# Patient Record
Sex: MALE | Race: WHITE | NOT HISPANIC OR LATINO | URBAN - METROPOLITAN AREA
[De-identification: names, ages, dates, MRNs, and addresses within clinical notes are randomized per-mention and may not be internally consistent; named-entity substitution may affect disease eponyms.]

---

## 2018-01-29 ENCOUNTER — INPATIENT (INPATIENT)
Facility: HOSPITAL | Age: 38
LOS: 1 days | Discharge: HOME | DRG: 100 | End: 2018-01-31
Attending: NEUROLOGICAL SURGERY | Admitting: NEUROLOGICAL SURGERY
Payer: COMMERCIAL

## 2018-01-29 VITALS
RESPIRATION RATE: 18 BRPM | TEMPERATURE: 98 F | OXYGEN SATURATION: 96 % | DIASTOLIC BLOOD PRESSURE: 85 MMHG | HEART RATE: 90 BPM | SYSTOLIC BLOOD PRESSURE: 147 MMHG

## 2018-01-29 DIAGNOSIS — Z98.890 OTHER SPECIFIED POSTPROCEDURAL STATES: Chronic | ICD-10-CM

## 2018-01-29 DIAGNOSIS — D49.6 NEOPLASM OF UNSPECIFIED BEHAVIOR OF BRAIN: ICD-10-CM

## 2018-01-29 DIAGNOSIS — R56.9 UNSPECIFIED CONVULSIONS: ICD-10-CM

## 2018-01-29 LAB
ANION GAP SERPL CALC-SCNC: 37 MMOL/L — HIGH (ref 5–17)
APTT BLD: 30.6 SEC — SIGNIFICANT CHANGE UP (ref 27.5–37.4)
BUN SERPL-MCNC: 21 MG/DL — SIGNIFICANT CHANGE UP (ref 7–23)
CALCIUM SERPL-MCNC: 10.4 MG/DL — SIGNIFICANT CHANGE UP (ref 8.4–10.5)
CHLORIDE SERPL-SCNC: 96 MMOL/L — SIGNIFICANT CHANGE UP (ref 96–108)
CO2 SERPL-SCNC: 13 MMOL/L — LOW (ref 22–31)
CREAT SERPL-MCNC: 1.29 MG/DL — SIGNIFICANT CHANGE UP (ref 0.5–1.3)
GLUCOSE SERPL-MCNC: 109 MG/DL — HIGH (ref 70–99)
HCT VFR BLD CALC: 51.3 % — HIGH (ref 39–50)
HGB BLD-MCNC: 16.3 G/DL — SIGNIFICANT CHANGE UP (ref 13–17)
INR BLD: 0.89 — SIGNIFICANT CHANGE UP (ref 0.88–1.16)
MCHC RBC-ENTMCNC: 28 PG — SIGNIFICANT CHANGE UP (ref 27–34)
MCHC RBC-ENTMCNC: 31.8 G/DL — LOW (ref 32–36)
MCV RBC AUTO: 88 FL — SIGNIFICANT CHANGE UP (ref 80–100)
PLATELET # BLD AUTO: 427 K/UL — HIGH (ref 150–400)
POTASSIUM SERPL-MCNC: 3.7 MMOL/L — SIGNIFICANT CHANGE UP (ref 3.5–5.3)
POTASSIUM SERPL-SCNC: 3.7 MMOL/L — SIGNIFICANT CHANGE UP (ref 3.5–5.3)
PROTHROM AB SERPL-ACNC: 9.9 SEC — SIGNIFICANT CHANGE UP (ref 9.8–12.7)
RBC # BLD: 5.83 M/UL — HIGH (ref 4.2–5.8)
RBC # FLD: 13.2 % — SIGNIFICANT CHANGE UP (ref 10.3–16.9)
SODIUM SERPL-SCNC: 146 MMOL/L — HIGH (ref 135–145)
WBC # BLD: 19.2 K/UL — HIGH (ref 3.8–10.5)
WBC # FLD AUTO: 19.2 K/UL — HIGH (ref 3.8–10.5)

## 2018-01-29 PROCEDURE — 70450 CT HEAD/BRAIN W/O DYE: CPT | Mod: 26

## 2018-01-29 PROCEDURE — 93010 ELECTROCARDIOGRAM REPORT: CPT | Mod: 59

## 2018-01-29 PROCEDURE — 99285 EMERGENCY DEPT VISIT HI MDM: CPT | Mod: 25

## 2018-01-29 RX ORDER — DOCUSATE SODIUM 100 MG
100 CAPSULE ORAL THREE TIMES A DAY
Qty: 0 | Refills: 0 | Status: DISCONTINUED | OUTPATIENT
Start: 2018-01-29 | End: 2018-01-31

## 2018-01-29 RX ORDER — ESCITALOPRAM OXALATE 10 MG/1
10 TABLET, FILM COATED ORAL DAILY
Qty: 0 | Refills: 0 | Status: DISCONTINUED | OUTPATIENT
Start: 2018-01-29 | End: 2018-01-31

## 2018-01-29 RX ORDER — ACETAMINOPHEN 500 MG
650 TABLET ORAL EVERY 6 HOURS
Qty: 0 | Refills: 0 | Status: DISCONTINUED | OUTPATIENT
Start: 2018-01-29 | End: 2018-01-31

## 2018-01-29 RX ORDER — ONDANSETRON 8 MG/1
4 TABLET, FILM COATED ORAL EVERY 6 HOURS
Qty: 0 | Refills: 0 | Status: DISCONTINUED | OUTPATIENT
Start: 2018-01-29 | End: 2018-01-31

## 2018-01-29 RX ORDER — LEVETIRACETAM 250 MG/1
500 TABLET, FILM COATED ORAL EVERY 12 HOURS
Qty: 0 | Refills: 0 | Status: DISCONTINUED | OUTPATIENT
Start: 2018-01-29 | End: 2018-01-29

## 2018-01-29 RX ORDER — TRAZODONE HCL 50 MG
50 TABLET ORAL AT BEDTIME
Qty: 0 | Refills: 0 | Status: DISCONTINUED | OUTPATIENT
Start: 2018-01-29 | End: 2018-01-31

## 2018-01-29 RX ORDER — LEVETIRACETAM 250 MG/1
1000 TABLET, FILM COATED ORAL
Qty: 0 | Refills: 0 | Status: DISCONTINUED | OUTPATIENT
Start: 2018-01-29 | End: 2018-01-31

## 2018-01-29 RX ORDER — METHYLPHENIDATE HCL 5 MG
10 TABLET ORAL
Qty: 0 | Refills: 0 | Status: DISCONTINUED | OUTPATIENT
Start: 2018-01-29 | End: 2018-01-30

## 2018-01-29 RX ORDER — ESCITALOPRAM OXALATE 10 MG/1
1 TABLET, FILM COATED ORAL
Qty: 0 | Refills: 0 | COMMUNITY

## 2018-01-29 RX ORDER — METHYLPHENIDATE HCL 5 MG
1 TABLET ORAL
Qty: 0 | Refills: 0 | COMMUNITY

## 2018-01-29 RX ORDER — DEXAMETHASONE 0.5 MG/5ML
10 ELIXIR ORAL ONCE
Qty: 0 | Refills: 0 | Status: COMPLETED | OUTPATIENT
Start: 2018-01-29 | End: 2018-01-29

## 2018-01-29 RX ORDER — TRAZODONE HCL 50 MG
1 TABLET ORAL
Qty: 0 | Refills: 0 | COMMUNITY

## 2018-01-29 RX ORDER — SODIUM CHLORIDE 9 MG/ML
1000 INJECTION INTRAMUSCULAR; INTRAVENOUS; SUBCUTANEOUS ONCE
Qty: 0 | Refills: 0 | Status: COMPLETED | OUTPATIENT
Start: 2018-01-29 | End: 2018-01-29

## 2018-01-29 RX ORDER — LEVETIRACETAM 250 MG/1
1000 TABLET, FILM COATED ORAL ONCE
Qty: 0 | Refills: 0 | Status: COMPLETED | OUTPATIENT
Start: 2018-01-29 | End: 2018-01-29

## 2018-01-29 RX ADMIN — SODIUM CHLORIDE 1000 MILLILITER(S): 9 INJECTION INTRAMUSCULAR; INTRAVENOUS; SUBCUTANEOUS at 20:40

## 2018-01-29 RX ADMIN — LEVETIRACETAM 400 MILLIGRAM(S): 250 TABLET, FILM COATED ORAL at 21:58

## 2018-01-29 RX ADMIN — Medication 10 MILLIGRAM(S): at 21:59

## 2018-01-29 RX ADMIN — Medication 2 MILLIGRAM(S): at 20:45

## 2018-01-29 NOTE — H&P ADULT - NSHPLABSRESULTS_GEN_ALL_CORE
< from: CT Head No Cont (01.29.18 @ 21:07) >    Status post right craniotomy/cranioplasty with a large region of   encephalomalacia/gliosis the right temporooccipital region. No midline   shift or herniation. Comparison with prior outside imaging is recommended   to assess for changes.    < end of copied text >

## 2018-01-29 NOTE — ED ADULT NURSE NOTE - OBJECTIVE STATEMENT
Pt BIBA from MD office where he reportedly had SEIZURE. Pt parents, pt has hx of BRAIN TUMOR recently dx'd. Had first known seizure a few weeks ago after having KEPPRA withdrawn. Pt then restarted on KEPPRA and was sx free until today. While in ED, pt had another SEIZURE witnessed by MD FOX. Pt given ATIVAN 2mg IV stat; followed by CT study with reported cerebral swelling. Pt then given KEPPRA 1 gm, with DECADRON 10mg IV. Pt now alert, oriented, mentating well. Awaiting Neuro Surg consult.

## 2018-01-29 NOTE — H&P ADULT - HISTORY OF PRESENT ILLNESS
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 Pt had another seizure in ER, given 2mg Ativan. Pt now awake, but slightly lethargic/Post ictal .No HA, n/v, no reported drugs/alcohol, fever, chills, pt has residual L visual cut since surgery.  	According to  pt had stable MRI Brain 2 weeks ago. Pt takes keppra 500 BID and took it this morning. PT had a seizure 6 weeks ago when they tried to wean him off keppra. Pt was loaded with 1G keppra in ER.

## 2018-01-29 NOTE — ED PROVIDER NOTE - OBJECTIVE STATEMENT
37 m w seizure- px w hx of Astrocytoma resected in April last year- no chemo/xrt s/p removal  presents s/p gen seizure in therapists office  no n/v, no reported drugs/alcohol  had neg MRI 2 weeks ago  no exac/allev factors  takes Keppra for seizures

## 2018-01-29 NOTE — H&P ADULT - ATTENDING COMMENTS
Patient seen and examined by me. Agree with above. He presented with a generalized tonic-clonic seizure in the setting of lowering his keppra dosing. Patient's neurosurgeon is Yury Mckeon and his neurooncologist is Sukhwinder Min. He plans to followup with these physicians upon discharge. Plan at this time is for increasing keppra dosing to 1 g bid, EEG monitoring with epilepsy consultation, and physical therapy for disposition planning.    Quentin Orantes M.D.  Neurosurgery

## 2018-01-29 NOTE — ED PROVIDER NOTE - MEDICAL DECISION MAKING DETAILS
seizure- on Keppra- increased cerebral edema- steroids given- d/w Epilepsy- rec IV Keppra 1 GM  d/w Dr Min at [facility]- agree with mgmt- do not recommend transfer tonight  await Nsurgical Eval seizure- on Keppra- increased cerebral edema- steroids given- d/w Epilepsy- rec IV Keppra 1 GM  d/w Dr Min at [facility]- agree with mgmt- do not recommend transfer tonight  await Nsurgical Eval  Patient was more responsive upon meeting. Awaiting neurosurgery disposition

## 2018-01-29 NOTE — H&P ADULT - NSHPPHYSICALEXAM_GEN_ALL_CORE
Lethargic, easily arousable  AxOX3  speech coherent  face symmetric  L vision cut  LUE pronator drift  LUE 3+/5  otherwise 5/5 motor  sensory intact  gait not assessed

## 2018-01-29 NOTE — ED PROVIDER NOTE - PROGRESS NOTE DETAILS
took sign out from dr. adams, pt to be admitted for nsg. pt has hx of epilepsy, cerebral edema, hx of astrocytoma. s/p removal; nsg evaluated. plan for admit to nsg icu

## 2018-01-30 LAB
ANION GAP SERPL CALC-SCNC: 16 MMOL/L — SIGNIFICANT CHANGE UP (ref 5–17)
APPEARANCE UR: CLEAR — SIGNIFICANT CHANGE UP
BILIRUB UR-MCNC: NEGATIVE — SIGNIFICANT CHANGE UP
BUN SERPL-MCNC: 15 MG/DL — SIGNIFICANT CHANGE UP (ref 7–23)
CALCIUM SERPL-MCNC: 9.5 MG/DL — SIGNIFICANT CHANGE UP (ref 8.4–10.5)
CHLORIDE SERPL-SCNC: 97 MMOL/L — SIGNIFICANT CHANGE UP (ref 96–108)
CO2 SERPL-SCNC: 21 MMOL/L — LOW (ref 22–31)
COLOR SPEC: YELLOW — SIGNIFICANT CHANGE UP
CREAT SERPL-MCNC: 0.92 MG/DL — SIGNIFICANT CHANGE UP (ref 0.5–1.3)
DIFF PNL FLD: (no result)
GLUCOSE SERPL-MCNC: 152 MG/DL — HIGH (ref 70–99)
GLUCOSE UR QL: NEGATIVE — SIGNIFICANT CHANGE UP
HCT VFR BLD CALC: 44.3 % — SIGNIFICANT CHANGE UP (ref 39–50)
HGB BLD-MCNC: 15.8 G/DL — SIGNIFICANT CHANGE UP (ref 13–17)
KETONES UR-MCNC: NEGATIVE — SIGNIFICANT CHANGE UP
LEUKOCYTE ESTERASE UR-ACNC: NEGATIVE — SIGNIFICANT CHANGE UP
MCHC RBC-ENTMCNC: 29.7 PG — SIGNIFICANT CHANGE UP (ref 27–34)
MCHC RBC-ENTMCNC: 35.7 G/DL — SIGNIFICANT CHANGE UP (ref 32–36)
MCV RBC AUTO: 83.3 FL — SIGNIFICANT CHANGE UP (ref 80–100)
NITRITE UR-MCNC: NEGATIVE — SIGNIFICANT CHANGE UP
PH UR: 5.5 — SIGNIFICANT CHANGE UP (ref 5–8)
PLATELET # BLD AUTO: 313 K/UL — SIGNIFICANT CHANGE UP (ref 150–400)
POTASSIUM SERPL-MCNC: 3.9 MMOL/L — SIGNIFICANT CHANGE UP (ref 3.5–5.3)
POTASSIUM SERPL-SCNC: 3.9 MMOL/L — SIGNIFICANT CHANGE UP (ref 3.5–5.3)
PROT UR-MCNC: NEGATIVE MG/DL — SIGNIFICANT CHANGE UP
RBC # BLD: 5.32 M/UL — SIGNIFICANT CHANGE UP (ref 4.2–5.8)
RBC # FLD: 13.1 % — SIGNIFICANT CHANGE UP (ref 10.3–16.9)
SODIUM SERPL-SCNC: 134 MMOL/L — LOW (ref 135–145)
SP GR SPEC: 1.02 — SIGNIFICANT CHANGE UP (ref 1–1.03)
UROBILINOGEN FLD QL: 0.2 E.U./DL — SIGNIFICANT CHANGE UP
WBC # BLD: 23.3 K/UL — HIGH (ref 3.8–10.5)
WBC # FLD AUTO: 23.3 K/UL — HIGH (ref 3.8–10.5)

## 2018-01-30 PROCEDURE — 95951: CPT | Mod: 26

## 2018-01-30 PROCEDURE — 99222 1ST HOSP IP/OBS MODERATE 55: CPT

## 2018-01-30 RX ORDER — METHYLPHENIDATE HCL 5 MG
10 TABLET ORAL
Qty: 0 | Refills: 0 | Status: DISCONTINUED | OUTPATIENT
Start: 2018-01-30 | End: 2018-01-31

## 2018-01-30 RX ORDER — TRAZODONE HCL 50 MG
50 TABLET ORAL ONCE
Qty: 0 | Refills: 0 | Status: COMPLETED | OUTPATIENT
Start: 2018-01-30 | End: 2018-01-31

## 2018-01-30 RX ADMIN — Medication 50 MILLIGRAM(S): at 01:40

## 2018-01-30 RX ADMIN — Medication 100 MILLIGRAM(S): at 06:02

## 2018-01-30 RX ADMIN — Medication 650 MILLIGRAM(S): at 01:28

## 2018-01-30 RX ADMIN — Medication 100 MILLIGRAM(S): at 15:22

## 2018-01-30 RX ADMIN — Medication 650 MILLIGRAM(S): at 01:03

## 2018-01-30 RX ADMIN — Medication 650 MILLIGRAM(S): at 07:59

## 2018-01-30 RX ADMIN — ESCITALOPRAM OXALATE 10 MILLIGRAM(S): 10 TABLET, FILM COATED ORAL at 12:31

## 2018-01-30 RX ADMIN — LEVETIRACETAM 1000 MILLIGRAM(S): 250 TABLET, FILM COATED ORAL at 18:27

## 2018-01-30 RX ADMIN — LEVETIRACETAM 1000 MILLIGRAM(S): 250 TABLET, FILM COATED ORAL at 06:02

## 2018-01-30 RX ADMIN — Medication 10 MILLIGRAM(S): at 06:02

## 2018-01-30 NOTE — PROGRESS NOTE ADULT - SUBJECTIVE AND OBJECTIVE BOX
HPI:  38 yo male w/px w hx of Astrocytoma IDH 1 mutation resected 80% in 2017 in [facility]  - no chemo/xrt s/p removal  	presents s/p gen seizure in therapists office. Pt had another seizure in ER, given 2mg Ativan. Pt now awake, but slightly lethargic/Post ictal .No HA, n/v, no reported drugs/alcohol, fever, chills, pt has residual L visual cut since surgery.  	According to  pt had stable MRI Brain 2 weeks ago. Pt takes keppra 500 BID and took it this morning. PT had a seizure 6 weeks ago when they tried to wean him off keppra. Pt was loaded with 1G keppra in ER. (2018 23:29)    OVERNIGHT EVENTS: No major  events overnight. Patient is on VEEG without complaint. Denies HA. Stable Left  side weakness  Vital Signs Last 24 Hrs  T(C): 36.9 (2018 09:33), Max: 37.2 (2018 02:00)  T(F): 98.4 (2018 09:33), Max: 98.9 (2018 02:00)  HR: 80 (2018 08:33) (68 - 90)  BP: 167/91 (2018 08:33) (119/83 - 167/91)  BP(mean): 116 (2018 08:33) (98 - 116)  RR: 16 (2018 04:36) (16 - 18)  SpO2: 96% (2018 08:33) (94% - 96%)    I&O's Summary  Physical Exam: Lethargic, easily arousable  	AxOX3  	speech coherent  	face symmetric  	L vision cut  	LUE pronator drift  	LUE 3+/5  	otherwise 5/5 motor  	sensory intact  TUBES/LINES:  [] Garcia  [] Lumbar Drain  [] Wound Drains  [] Others      DIET: regular  [] NPO  [] Mechanical  [] Tube feeds    LABS:                        15.8   23.3  )-----------( 313      ( 2018 09:04 )             44.3     01-30    134<L>  |  97  |  15  ----------------------------<  152<H>  3.9   |  21<L>  |  0.92    Ca    9.5      2018 09:04      PT/INR - ( 2018 21:04 )   PT: 9.9 sec;   INR: 0.89          PTT - ( 2018 21:04 )  PTT:30.6 sec  Urinalysis Basic - ( 2018 00:34 )    Color: Yellow / Appearance: Clear / S.025 / pH: x  Gluc: x / Ketone: NEGATIVE  / Bili: Negative / Urobili: 0.2 E.U./dL   Blood: x / Protein: NEGATIVE mg/dL / Nitrite: NEGATIVE   Leuk Esterase: NEGATIVE / RBC: < 5 /HPF / WBC < 5 /HPF   Sq Epi: x / Non Sq Epi: 0-5 /HPF / Bacteria: Present /HPF          CAPILLARY BLOOD GLUCOSE          Drug Levels: [] N/A    CSF Analysis: [] N/A      Allergies    penicillin (Rash; Urticaria; Hives)    Intolerances      MEDICATIONS:  Antibiotics:    Neuro:  acetaminophen   Tablet. 650 milliGRAM(s) Oral every 6 hours PRN  escitalopram 10 milliGRAM(s) Oral daily  levETIRAcetam 1000 milliGRAM(s) Oral two times a day  methylphenidate 10 milliGRAM(s) Oral two times a day  ondansetron Injectable 4 milliGRAM(s) IV Push every 6 hours PRN  traZODone 50 milliGRAM(s) Oral at bedtime    Anticoagulation:    OTHER:  docusate sodium 100 milliGRAM(s) Oral three times a day    IVF:    CULTURES:    RADIOLOGY & ADDITIONAL TESTS:      ASSESSMENT:  37y Male s/p seizure at home. Now on VEEG, Doing well    PLAN: complete video EEG. If no activity on Keppra 1G bid, will discharge home to follow with his neurosurgeon of [facility].  PT/OT  DW Dr. Orantes    DVT PROPHYLAXIS:  [x] Venodynes                                [x] Heparin/Lovenox    DISPOSITION: pending PT.

## 2018-01-31 VITALS — TEMPERATURE: 98 F

## 2018-01-31 PROCEDURE — 80177 DRUG SCRN QUAN LEVETIRACETAM: CPT

## 2018-01-31 PROCEDURE — 93005 ELECTROCARDIOGRAM TRACING: CPT

## 2018-01-31 PROCEDURE — 85027 COMPLETE CBC AUTOMATED: CPT

## 2018-01-31 PROCEDURE — 85610 PROTHROMBIN TIME: CPT

## 2018-01-31 PROCEDURE — 70450 CT HEAD/BRAIN W/O DYE: CPT

## 2018-01-31 PROCEDURE — 36415 COLL VENOUS BLD VENIPUNCTURE: CPT

## 2018-01-31 PROCEDURE — 85730 THROMBOPLASTIN TIME PARTIAL: CPT

## 2018-01-31 PROCEDURE — 80048 BASIC METABOLIC PNL TOTAL CA: CPT

## 2018-01-31 PROCEDURE — 81001 URINALYSIS AUTO W/SCOPE: CPT

## 2018-01-31 PROCEDURE — 95813 EEG EXTND MNTR 61-119 MIN: CPT | Mod: 26

## 2018-01-31 PROCEDURE — 96374 THER/PROPH/DIAG INJ IV PUSH: CPT

## 2018-01-31 PROCEDURE — 99285 EMERGENCY DEPT VISIT HI MDM: CPT | Mod: 25

## 2018-01-31 PROCEDURE — 95951: CPT

## 2018-01-31 PROCEDURE — 96375 TX/PRO/DX INJ NEW DRUG ADDON: CPT

## 2018-01-31 RX ORDER — LEVETIRACETAM 250 MG/1
1 TABLET, FILM COATED ORAL
Qty: 60 | Refills: 0 | OUTPATIENT
Start: 2018-01-31 | End: 2018-03-01

## 2018-01-31 RX ORDER — ACETAMINOPHEN 500 MG
2 TABLET ORAL
Qty: 0 | Refills: 0 | COMMUNITY
Start: 2018-01-31

## 2018-01-31 RX ORDER — DOCUSATE SODIUM 100 MG
1 CAPSULE ORAL
Qty: 0 | Refills: 0 | COMMUNITY
Start: 2018-01-31

## 2018-01-31 RX ORDER — LEVETIRACETAM 250 MG/1
1 TABLET, FILM COATED ORAL
Qty: 0 | Refills: 0 | COMMUNITY

## 2018-01-31 RX ADMIN — ESCITALOPRAM OXALATE 10 MILLIGRAM(S): 10 TABLET, FILM COATED ORAL at 11:21

## 2018-01-31 RX ADMIN — LEVETIRACETAM 1000 MILLIGRAM(S): 250 TABLET, FILM COATED ORAL at 07:09

## 2018-01-31 RX ADMIN — Medication 100 MILLIGRAM(S): at 07:09

## 2018-01-31 RX ADMIN — Medication 10 MILLIGRAM(S): at 07:09

## 2018-01-31 RX ADMIN — Medication 100 MILLIGRAM(S): at 00:09

## 2018-01-31 RX ADMIN — Medication 50 MILLIGRAM(S): at 00:09

## 2018-01-31 NOTE — DISCHARGE NOTE ADULT - PATIENT PORTAL LINK FT
“You can access the [employer] Patient Portal, offered by [facility], by registering with the following website: [email or web address]”

## 2018-01-31 NOTE — DISCHARGE NOTE ADULT - MEDICATION SUMMARY - MEDICATIONS TO STOP TAKING
I will STOP taking the medications listed below when I get home from the hospital:    Keppra 500 mg oral tablet  -- 1 tab(s) by mouth 2 times a day

## 2018-01-31 NOTE — DISCHARGE NOTE ADULT - HOSPITAL COURSE
Admitted for evaluation of Seizure . IT resolved with  keppra IV. Patient was placed on keppra 1000mg bid. Video EEG did not show any new seizure activity.

## 2018-01-31 NOTE — PROGRESS NOTE ADULT - SUBJECTIVE AND OBJECTIVE BOX
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 Pt had another seizure in ER, given 2mg Ativan. Pt now awake, but slightly lethargic/Post ictal .No HA, n/v, no reported drugs/alcohol, fever, chills, pt has residual L visual cut since surgery.  	According to  pt had stable MRI Brain 2 weeks ago. Pt takes keppra 500 BID and took it this morning. PT had a seizure 6 weeks ago when they tried to wean him off keppra. Pt was loaded with 1G keppra in ER. (2018 23:29)    OVERNIGHT EVENTS: No major events overnight. Some EEG cables got misplaced. It was readjusted this morning.  Patient has no complaint. He is expecting to go home today. He is on keppra 1 grm BID, denies clinical seizure.  Vital Signs Last 24 Hrs  T(C): 37 (2018 08:51), Max: 37.1 (2018 14:03)  T(F): 98.6 (2018 08:51), Max: 98.7 (2018 14:03)  HR: 78 (2018 08:51) (54 - 78)  BP: 120/67 (2018 08:51) (103/62 - 134/84)  BP(mean): 98 (2018 04:43) (75 - 103)  RR: 12 (2018 08:51) (12 - 22)  SpO2: 97% (2018 08:51) (95% - 97%)    I&O's Summary      PHYSICAL EXAM:  Neurological:  A& x 3 NAD, PERRL, EOMI    Motor exam: improved left side strenght. 5/5 x 4. No sensory deficit          TUBES/LINES:  [] Garcia  [] Lumbar Drain  [] Wound Drains  [] Others      DIET:  [] NPO  [] Mechanical  [] Tube feeds    LABS:                        15.8   23.3  )-----------( 313      ( 2018 09:04 )             44.3     01-30    134<L>  |  97  |  15  ----------------------------<  152<H>  3.9   |  21<L>  |  0.92    Ca    9.5      2018 09:04      PT/INR - ( 2018 21:04 )   PT: 9.9 sec;   INR: 0.89          PTT - ( 2018 21:04 )  PTT:30.6 sec  Urinalysis Basic - ( 2018 00:34 )    Color: Yellow / Appearance: Clear / S.025 / pH: x  Gluc: x / Ketone: NEGATIVE  / Bili: Negative / Urobili: 0.2 E.U./dL   Blood: x / Protein: NEGATIVE mg/dL / Nitrite: NEGATIVE   Leuk Esterase: NEGATIVE / RBC: < 5 /HPF / WBC < 5 /HPF   Sq Epi: x / Non Sq Epi: 0-5 /HPF / Bacteria: Present /HPF          CAPILLARY BLOOD GLUCOSE          Drug Levels: [] N/A    CSF Analysis: [] N/A      Allergies    penicillin (Rash; Urticaria; Hives)    Intolerances      MEDICATIONS:  Antibiotics:    Neuro:  acetaminophen   Tablet. 650 milliGRAM(s) Oral every 6 hours PRN  escitalopram 10 milliGRAM(s) Oral daily  levETIRAcetam 1000 milliGRAM(s) Oral two times a day  methylphenidate 10 milliGRAM(s) Oral <User Schedule>  ondansetron Injectable 4 milliGRAM(s) IV Push every 6 hours PRN  traZODone 50 milliGRAM(s) Oral at bedtime    Anticoagulation:    OTHER:  docusate sodium 100 milliGRAM(s) Oral three times a day    IVF:    CULTURES:    RADIOLOGY & ADDITIONAL TESTS:      ASSESSMENT:37y Male s/p seizure at home. Now on VEEG, Doing well. Awaiting discharge today    PLAN:  neurology to read VEEG, if no seizure, will discharge him home to follow with his [facility] neirologist and neurosurgeon.  ERNIE Orantes  DVT PROPHYLAXIS:  [x] Venodynes                                [x] Heparin/Lovenox    DISPOSITION:

## 2018-01-31 NOTE — DISCHARGE NOTE ADULT - MEDICATION SUMMARY - MEDICATIONS TO TAKE
I will START or STAY ON the medications listed below when I get home from the hospital:    acetaminophen 325 mg oral tablet  -- 2 tab(s) by mouth every 6 hours, As needed, Mild Pain (1 - 3)  -- Indication: For pain or fever    Keppra 1000 mg oral tablet  -- 1 tab(s) by mouth 2 times a day MDD:2  -- Indication: For Seizure    traZODone 50 mg oral tablet  -- 1 tab(s) by mouth once a day (at bedtime)  -- Indication: For anxiety    Lexapro 10 mg oral tablet  -- 1 tab(s) by mouth once a day  -- Indication: For depresion    Ritalin 10 mg oral tablet  -- 1 tab(s) by mouth 2 times a day  -- Indication: For Brain tumor    docusate sodium 100 mg oral capsule  -- 1 cap(s) by mouth 3 times a day  -- Indication: For Bowel regimen

## 2018-01-31 NOTE — DISCHARGE NOTE ADULT - ADDITIONAL INSTRUCTIONS
NO seizure activity noted in VEEG. Please, follow with  your [facility] doctors: Neurosurgeon - Yury Mckeon and Oncologist: Sukhwinder Min.  Keppra dose is increased to 1000mg 2 times per day.

## 2018-01-31 NOTE — DISCHARGE NOTE ADULT - CARE PROVIDER_API CALL
Quentin Orantes), Neurosurgery  [address], [place] [facility]  Phone: [phone]  Fax: [phone]

## 2018-01-31 NOTE — DISCHARGE NOTE ADULT - CARE PLAN
Principal Discharge DX:	Seizure  Goal:	stop seizure activity  Assessment and plan of treatment:	keppra increased to 1000mg bid. No seizure activity on video EEG.

## 2018-02-01 LAB — LEVETIRACETAM SERPL-MCNC: 5.1 MCG/ML — LOW (ref 12–46)

## 2018-02-05 DIAGNOSIS — Z86.011 PERSONAL HISTORY OF BENIGN NEOPLASM OF THE BRAIN: ICD-10-CM

## 2018-02-05 DIAGNOSIS — G40.409 OTHER GENERALIZED EPILEPSY AND EPILEPTIC SYNDROMES, NOT INTRACTABLE, WITHOUT STATUS EPILEPTICUS: ICD-10-CM

## 2018-02-05 DIAGNOSIS — Z98.890 OTHER SPECIFIED POSTPROCEDURAL STATES: ICD-10-CM

## 2018-02-05 DIAGNOSIS — Z88.0 ALLERGY STATUS TO PENICILLIN: ICD-10-CM

## 2018-02-05 DIAGNOSIS — Z79.899 OTHER LONG TERM (CURRENT) DRUG THERAPY: ICD-10-CM

## 2018-02-05 DIAGNOSIS — Z28.21 IMMUNIZATION NOT CARRIED OUT BECAUSE OF PATIENT REFUSAL: ICD-10-CM

## 2018-02-05 DIAGNOSIS — G93.6 CEREBRAL EDEMA: ICD-10-CM

## 2022-06-16 NOTE — ED PROVIDER NOTE - EYES, MLM
Continue Acalabrutinib as directed.  Hold Promacta until further notice.      
Clear bilaterally, pupils equal, round and reactive to light.

## 2024-07-02 NOTE — PATIENT PROFILE ADULT. - PAIN CHRONIC, PROFILE
Well Visit, Ages 18 to 65: Care Instructions  Well visits can help you stay healthy. Your doctor has checked your overall health and may have suggested ways to take good care of yourself. Your doctor also may have recommended tests. You can help prevent illness with healthy eating, good sleep, vaccinations, regular exercise, and other steps.    Get the tests that you and your doctor decide on. Depending on your age and risks, examples might include screening for diabetes; hepatitis C; HIV; and cervical, breast, lung, and colon cancer. Screening helps find diseases before any symptoms appear.   Eat healthy foods. Choose fruits, vegetables, whole grains, lean protein, and low-fat dairy foods. Limit saturated fat and reduce salt.     Limit alcohol. Men should have no more than 2 drinks a day. Women should have no more than 1. For some people, no alcohol is the best choice.   Exercise. Get at least 30 minutes of exercise on most days of the week. Walking can be a good choice.     Reach and stay at your healthy weight. This will lower your risk for many health problems.   Take care of your mental health. Try to stay connected with friends, family, and community, and find ways to manage stress.     If you're feeling depressed or hopeless, talk to someone. A counselor can help. If you don't have a counselor, talk to your doctor.   Talk to your doctor if you think you may have a problem with alcohol or drug use. This includes prescription medicines, marijuana, and other drugs.     Avoid tobacco and nicotine: Don't smoke, vape, or chew. If you need help quitting, talk to your doctor.   Practice safer sex. Getting tested, using condoms or dental dams, and limiting sex partners can help prevent STIs.     Use birth control if it's important to you to prevent pregnancy. Talk with your doctor about your choices and what might be best for you.   Prevent problems where you can. Protect your skin from too much sun, wash your 
no